# Patient Record
Sex: FEMALE | Race: WHITE
[De-identification: names, ages, dates, MRNs, and addresses within clinical notes are randomized per-mention and may not be internally consistent; named-entity substitution may affect disease eponyms.]

---

## 2018-07-28 ENCOUNTER — HOSPITAL ENCOUNTER (EMERGENCY)
Dept: HOSPITAL 62 - ER | Age: 58
LOS: 1 days | Discharge: HOME | End: 2018-07-29
Payer: SELF-PAY

## 2018-07-28 VITALS — DIASTOLIC BLOOD PRESSURE: 78 MMHG | SYSTOLIC BLOOD PRESSURE: 133 MMHG

## 2018-07-28 DIAGNOSIS — Z88.2: ICD-10-CM

## 2018-07-28 DIAGNOSIS — M79.672: ICD-10-CM

## 2018-07-28 DIAGNOSIS — R03.0: ICD-10-CM

## 2018-07-28 DIAGNOSIS — F17.210: ICD-10-CM

## 2018-07-28 DIAGNOSIS — R10.9: ICD-10-CM

## 2018-07-28 DIAGNOSIS — M54.9: ICD-10-CM

## 2018-07-28 DIAGNOSIS — N12: Primary | ICD-10-CM

## 2018-07-28 LAB
ADD MANUAL DIFF: NO
ALBUMIN SERPL-MCNC: 4.4 G/DL (ref 3.5–5)
ALP SERPL-CCNC: 75 U/L (ref 38–126)
ALT SERPL-CCNC: 18 U/L (ref 9–52)
ANION GAP SERPL CALC-SCNC: 15 MMOL/L (ref 5–19)
APPEARANCE UR: (no result)
APTT PPP: (no result) S
AST SERPL-CCNC: 19 U/L (ref 14–36)
BASOPHILS # BLD AUTO: 0 10^3/UL (ref 0–0.2)
BASOPHILS NFR BLD AUTO: 0.4 % (ref 0–2)
BILIRUB DIRECT SERPL-MCNC: 0.2 MG/DL (ref 0–0.4)
BILIRUB SERPL-MCNC: 0.3 MG/DL (ref 0.2–1.3)
BILIRUB UR QL STRIP: NEGATIVE
BUN SERPL-MCNC: 7 MG/DL (ref 7–20)
CALCIUM: 9.8 MG/DL (ref 8.4–10.2)
CHLORIDE SERPL-SCNC: 104 MMOL/L (ref 98–107)
CO2 SERPL-SCNC: 25 MMOL/L (ref 22–30)
EOSINOPHIL # BLD AUTO: 0 10^3/UL (ref 0–0.6)
EOSINOPHIL NFR BLD AUTO: 0.4 % (ref 0–6)
ERYTHROCYTE [DISTWIDTH] IN BLOOD BY AUTOMATED COUNT: 13.5 % (ref 11.5–14)
GLUCOSE SERPL-MCNC: 115 MG/DL (ref 75–110)
GLUCOSE UR STRIP-MCNC: NEGATIVE MG/DL
HCT VFR BLD CALC: 37.4 % (ref 36–47)
HGB BLD-MCNC: 12.7 G/DL (ref 12–15.5)
KETONES UR STRIP-MCNC: NEGATIVE MG/DL
LYMPHOCYTES # BLD AUTO: 1.4 10^3/UL (ref 0.5–4.7)
LYMPHOCYTES NFR BLD AUTO: 17.7 % (ref 13–45)
MCH RBC QN AUTO: 30.5 PG (ref 27–33.4)
MCHC RBC AUTO-ENTMCNC: 33.9 G/DL (ref 32–36)
MCV RBC AUTO: 90 FL (ref 80–97)
MONOCYTES # BLD AUTO: 0.6 10^3/UL (ref 0.1–1.4)
MONOCYTES NFR BLD AUTO: 7.7 % (ref 3–13)
NEUTROPHILS # BLD AUTO: 5.6 10^3/UL (ref 1.7–8.2)
NEUTS SEG NFR BLD AUTO: 73.8 % (ref 42–78)
NITRITE UR QL STRIP: NEGATIVE
PH UR STRIP: 6 [PH] (ref 5–9)
PLATELET # BLD: 440 10^3/UL (ref 150–450)
POTASSIUM SERPL-SCNC: 3.7 MMOL/L (ref 3.6–5)
PROT SERPL-MCNC: 8.1 G/DL (ref 6.3–8.2)
PROT UR STRIP-MCNC: 30 MG/DL
RBC # BLD AUTO: 4.16 10^6/UL (ref 3.72–5.28)
SODIUM SERPL-SCNC: 143.9 MMOL/L (ref 137–145)
SP GR UR STRIP: 1
TOTAL CELLS COUNTED % (AUTO): 100 %
UROBILINOGEN UR-MCNC: NEGATIVE MG/DL (ref ?–2)
WBC # BLD AUTO: 7.6 10^3/UL (ref 4–10.5)

## 2018-07-28 PROCEDURE — 80053 COMPREHEN METABOLIC PANEL: CPT

## 2018-07-28 PROCEDURE — 87186 SC STD MICRODIL/AGAR DIL: CPT

## 2018-07-28 PROCEDURE — 36415 COLL VENOUS BLD VENIPUNCTURE: CPT

## 2018-07-28 PROCEDURE — 81001 URINALYSIS AUTO W/SCOPE: CPT

## 2018-07-28 PROCEDURE — 87088 URINE BACTERIA CULTURE: CPT

## 2018-07-28 PROCEDURE — 99284 EMERGENCY DEPT VISIT MOD MDM: CPT

## 2018-07-28 PROCEDURE — 73630 X-RAY EXAM OF FOOT: CPT

## 2018-07-28 PROCEDURE — 85025 COMPLETE CBC W/AUTO DIFF WBC: CPT

## 2018-07-28 PROCEDURE — 72070 X-RAY EXAM THORAC SPINE 2VWS: CPT

## 2018-07-28 PROCEDURE — 72110 X-RAY EXAM L-2 SPINE 4/>VWS: CPT

## 2018-07-28 PROCEDURE — 87086 URINE CULTURE/COLONY COUNT: CPT

## 2018-07-28 NOTE — RADIOLOGY REPORT (SQ)
EXAM DESCRIPTION:  L SPINE WHOLE



COMPLETED DATE/TIME:  7/28/2018 6:07 pm



REASON FOR STUDY:  low back pain



COMPARISON:  None.



NUMBER OF VIEWS:  Five views including obliques.



TECHNIQUE:  AP, lateral, oblique, and sacral radiographic images acquired of the lumbar spine.



LIMITATIONS:  None.



FINDINGS:  MINERALIZATION: Normal.

SEGMENTATION: Normal.  No transitional anatomy.

ALIGNMENT: Grade 2 anterolisthesis of L5 on S1 due to bilateral L5 pars interarticularis defects.

VERTEBRAE: Maintained height.  No fracture or worrisome bone lesion.

DISCS: Multilevel disc space narrowing with osteophytes.

POSTERIOR ELEMENTS: Grade 2 anterolisthesis of L5 on S1 due to bilateral L5 pars interarticularis def
ects.. Facet arthropathy is present.

HARDWARE: None in the spine.

PARASPINAL SOFT TISSUES: Probable left renal stones.

PELVIS: Intact as visualized. No fractures or worrisome bone lesions. SI joints intact.

OTHER: No other significant finding.



IMPRESSION:  Grade 2 anterolisthesis of L5 on S1 due to bilateral L5 pars interarticularis defects.  
No vertebral compression deformity.



TECHNICAL DOCUMENTATION:  JOB ID:  [id number]

[id number]

 2011 [employer]- All Rights Reserved



Reading location - IP/workstation name: [email or web address]

## 2018-07-28 NOTE — RADIOLOGY REPORT (SQ)
EXAM DESCRIPTION:  FOOT LEFT COMPLETE



COMPLETED DATE/TIME:  7/28/2018 6:07 pm



REASON FOR STUDY:  left foot pain



COMPARISON:  None.



NUMBER OF VIEWS:  Three views.



TECHNIQUE:  AP, lateral and oblique  radiographic images acquired of the left foot.



LIMITATIONS:  None.



FINDINGS:  MINERALIZATION: Normal.

BONES: No acute fracture or dislocation.  No worrisome bone lesions.

JOINTS: No effusions.

SOFT TISSUES: No soft tissue swelling.  No foreign body.

OTHER: No other significant finding.



IMPRESSION:  NO RADIOGRAPHIC EVIDENCE OF ACUTE INJURY.



TECHNICAL DOCUMENTATION:  JOB ID:  [id number]

[id number]

 2011 [employer]- All Rights Reserved



Reading location - IP/workstation name: [email or web address]

## 2018-07-28 NOTE — RADIOLOGY REPORT (SQ)
EXAM DESCRIPTION:  T SPINE AP/LAT



COMPLETED DATE/TIME:  7/28/2018 6:07 pm



REASON FOR STUDY:  pain



COMPARISON:  None.



NUMBER OF VIEWS:  Two views.



TECHNIQUE:  AP and lateral radiographic images acquired of the thoracic spine.



LIMITATIONS:  None.



FINDINGS:  MINERALIZATION: Normal.

ALIGNMENT: Minimal scoliosis.

VERTEBRAE: No fracture or bone lesion.  Maintained height, normal segmentation.

DISCS: No significant loss of height or significant narrowing.  No large osteophytes.

HARDWARE: None in the spine.

MEDIASTINUM AND SOFT TISSUES: Normal heart size and aortic contour.  No soft tissue abnormality.

VISUALIZED LUNG FIELDS: Clear.

OTHER: No other significant finding.



IMPRESSION:  No acute findings.



TECHNICAL DOCUMENTATION:  JOB ID:  [id number]

[id number]

 2011 [employer]- All Rights Reserved



Reading location - IP/workstation name: [email or web address]

## 2018-07-28 NOTE — ER DOCUMENT REPORT
ED Medical Screen (RME)





- General


Chief Complaint: Leg Pain


Stated Complaint: LEG PAIN


Time Seen by Provider: 07/28/18 17:27


Mode of Arrival: Wheelchair


Information source: Patient


Notes: 





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 

to the emergency room with low back pain radiating down the left leg.  Patient 

states she has had it for the last few weeks.  She also complains of left foot 

pain.  She does states she has been walking a lot.  She states she was here in 

June and evaluated and was found to have a UTI.  She states she never took the 

antibiotic.  She also reports that she was diagnosed with kidney stones at that 

time.  Currently, she denies any nausea or vomiting.  She does not have any 

abdominal pain.  And her symptoms appear radicular in nature.  She denies any 

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

.  She has not followed by a physician at this time.


TRAVEL OUTSIDE OF THE [place] IN LAST 30 DAYS: No





- Related Data


Allergies/Adverse Reactions: 


 





Sulfa (Sulfonamide Antibiotics) Allergy (Verified 07/28/18 16:14)


 











Past Medical History





- Social History


Chew tobacco use (# tins/day): No


Frequency of alcohol use: None


Drug Abuse: None


Renal/ Medical History: Reports: Hx Kidney Stones.  Denies: Hx Peritoneal 

Dialysis





Physical Exam





- Vital signs


Vitals: 





 











Temp Pulse Resp BP Pulse Ox


 


 97.4 F   91   20   166/115 H  98 


 


 07/28/18 16:28  07/28/18 16:28  07/28/18 16:28  07/28/18 16:28  07/28/18 16:28














Course





- Vital Signs


Vital signs: 





 











Temp Pulse Resp BP Pulse Ox


 


 97.4 F   91   20   166/115 H  98 


 


 07/28/18 16:28  07/28/18 16:28  07/28/18 16:28  07/28/18 16:28  07/28/18 16:28
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
## 2018-07-30 ENCOUNTER — HOSPITAL ENCOUNTER (EMERGENCY)
Dept: HOSPITAL 62 - ER | Age: 58
LOS: 3 days | Discharge: HOME | End: 2018-08-02
Payer: SELF-PAY

## 2018-07-30 DIAGNOSIS — N20.0: ICD-10-CM

## 2018-07-30 DIAGNOSIS — F22: ICD-10-CM

## 2018-07-30 DIAGNOSIS — R11.0: ICD-10-CM

## 2018-07-30 DIAGNOSIS — F29: ICD-10-CM

## 2018-07-30 DIAGNOSIS — R09.81: ICD-10-CM

## 2018-07-30 DIAGNOSIS — N11.8: Primary | ICD-10-CM

## 2018-07-30 LAB
APPEARANCE UR: (no result)
APTT PPP: YELLOW S
BARBITURATES UR QL SCN: NEGATIVE
BILIRUB UR QL STRIP: NEGATIVE
GLUCOSE UR STRIP-MCNC: NEGATIVE MG/DL
KETONES UR STRIP-MCNC: NEGATIVE MG/DL
METHADONE UR QL SCN: NEGATIVE
NITRITE UR QL STRIP: NEGATIVE
PCP UR QL SCN: NEGATIVE
PH UR STRIP: 7 [PH] (ref 5–9)
PROT UR STRIP-MCNC: 30 MG/DL
SP GR UR STRIP: 1
URINE AMPHETAMINES SCREEN: NEGATIVE
URINE BENZODIAZEPINES SCREEN: NEGATIVE
URINE COCAINE SCREEN: NEGATIVE
URINE MARIJUANA (THC) SCREEN: NEGATIVE
UROBILINOGEN UR-MCNC: NEGATIVE MG/DL (ref ?–2)

## 2018-07-30 PROCEDURE — 80053 COMPREHEN METABOLIC PANEL: CPT

## 2018-07-30 PROCEDURE — 87040 BLOOD CULTURE FOR BACTERIA: CPT

## 2018-07-30 PROCEDURE — 80307 DRUG TEST PRSMV CHEM ANLYZR: CPT

## 2018-07-30 PROCEDURE — 87086 URINE CULTURE/COLONY COUNT: CPT

## 2018-07-30 PROCEDURE — 96365 THER/PROPH/DIAG IV INF INIT: CPT

## 2018-07-30 PROCEDURE — 36415 COLL VENOUS BLD VENIPUNCTURE: CPT

## 2018-07-30 PROCEDURE — 96366 THER/PROPH/DIAG IV INF ADDON: CPT

## 2018-07-30 PROCEDURE — 96375 TX/PRO/DX INJ NEW DRUG ADDON: CPT

## 2018-07-30 PROCEDURE — 96372 THER/PROPH/DIAG INJ SC/IM: CPT

## 2018-07-30 PROCEDURE — 85025 COMPLETE CBC W/AUTO DIFF WBC: CPT

## 2018-07-30 PROCEDURE — 93005 ELECTROCARDIOGRAM TRACING: CPT

## 2018-07-30 PROCEDURE — 81001 URINALYSIS AUTO W/SCOPE: CPT

## 2018-07-30 PROCEDURE — 93010 ELECTROCARDIOGRAM REPORT: CPT

## 2018-07-30 PROCEDURE — 99285 EMERGENCY DEPT VISIT HI MDM: CPT

## 2018-07-30 NOTE — ER DOCUMENT REPORT
ED General





- General


Chief Complaint: Flank Pain


Stated Complaint: LEFT FLANK PAIN


Time Seen by Provider: 07/30/18 22:56


Notes: 





Patient is a 57-year-old female presents with her daughter.  Daughter is 

employed here at the hospital.  The patient's difficult to get a good history 

from.  Should history from paramedics was that she says she has a kidney stone 

the left side she is having pain with the.  Further history from the daughter 

reveals that she was actually seen here 24 hours ago and diagnosed with a 

kidney infection and sent home.  Patient was sent home on Cipro.  She took 1 

dose since being discharged from the hospital being that she just recently got 

her medications.  All information to patient's previous visit is actually under 

a different name.  She was registered accidentally under her maiden name.  The 

name that she is registered under for her last visit is Sammie Garcia.  

Account number for the visit is [id number].  When I asked the patient served 

strong she just continues to cry and scream out that she wants something to 

drink.  It is very difficult to get any further information from the patient as 

she is emotionally distressed and not very consolable.  I spoke with the 

daughter outside the room.  The daughter says that the patient does have kidney 

infection however the bigger problem is that the patient has some psychiatric 

illness that is untreated.  Daughter says that for many years the patient will 

have episodes where she goes through times of delusions.  The daughter says in 

the last month or so it has gotten worse in the last week it has become even 

worse.  Daughter says that the patient will sometimes seem perfectly normal 

then suddenly will go outside and start screaming at the mood and at the gema 

yelling "I will kill everybody".  She will then go back to having a somewhat 

normal affect.  Daughter says that the patient will sometimes go on for several 

days of just being delusional and talking to and examining objects that are not 

there.  Patient was actually seen here for similar symptoms in 2016 and then 

sent to [place].  The daughter says when she was at [place] she was 

placed on medications she did very well on this medication; however, the 

patient never followed up with psychiatry afterwards and therefore she stopped 

taking medications and her symptoms have continued to recur since being off 

these medications.  The daughter cannot remember the name of the medications.  

As far as a kidney infection the patient does have a history of a large stone 

in the left kidney.  It is difficult to get any history in regards to whether 

or not the patient had any treatment for this or not as the patient again is 

difficult to console or speak with.


TRAVEL OUTSIDE OF THE [place] IN LAST 30 DAYS: No





- Related Data


Allergies/Adverse Reactions: 


 





No Known Allergies Allergy (Verified 11/22/12 20:01)


 











Past Medical History





- Social History


Smoking Status: Unknown if Ever Smoked


Frequency of alcohol use: None


Drug Abuse: None


Family History: Reviewed & Not Pertinent





- Immunizations


Hx Diphtheria, Pertussis, Tetanus Vaccination:  - unknown





Review of Systems





- Review of Systems


Notes: 





My Normal Review Basic





REVIEW OF SYSTEMS:


CONSTITUTIONAL :  Denies fever,  chills, or sweats.  Denies recent illness.


CARDIOVASCULAR:  Denies chest pain.


RESPIRATORY:  Denies cough, cold, or chest congestion.  Denies shortness of 

breath, difficulty breathing, or wheezing.


GASTROINTESTINAL: left sided back pain.  Some nausea.


GENITOURINARY: Dysuria.  Recent diagnosis of very tract infection


MUSCULOSKELETAL:  Denies neck or back pain or joint pain or swelling.


SKIN:   Denies rash or skin lesions.


NEUROLOGICAL:  Denies altered mental status or loss of consciousness.  Denies 

headache.  Denies weakness or paralysis or loss of use of either side.  Denies 

problems with gait or speech.  Denies sensory or motor loss.


Psychiatric: History of delusions and yelling in eminent objects and recently 

being emotionally unstable.


ALL OTHER SYSTEMS REVIEWED AND NEGATIVE.





Physical Exam





- Vital signs


Vitals: 


 











Temp Pulse Resp BP Pulse Ox


 


 98.2 F   76   18   115/60   98 


 


 07/31/18 02:15  07/31/18 02:15  07/31/18 02:15  07/31/18 02:15  07/31/18 02:15














- Notes


Notes: 





General Appearance: She is emotionally hysterical.  Is difficult to tell if she 

is actually having true pain if she is just emotional.  She is constantly 

crying and asking for something to drink.


Vitals: reviewed, See vital signs table.


Head: no swelling or tenderness to the head


Eyes: PERRL, EOMI, Conjuctiva clear


Mouth: No decreasd moisture


Lungs: No wheezing, No rales, No rhonci, No accessory muscle use, good air 

exchange bilaterally.


Heart: Normal rate, Regular rythm, No murmur, no rub


Abdomen: Normal BS, soft, No rigidity, No reproducible abdominal tenderness to 

palpation, No guarding, no rebound, no abdominal masses, no organomegaly


Back: No reproducible pain palpation on the back.


Extremities: strength 5/5 in all extremities, good pulses in all extremities, 

no swelling or tenderness in the extremities, no edema.


Skin: warm, dry, appropriate color, no rash


Neuro: speech clear, oriented x 2, odd affect, patient's constantly crying and 

difficult to respond to questions appropriately.  Cranial nerves II through XII 

are intact.  Patient is able to move all 4 extremities on her own without any 

difficulty..





Course





- Re-evaluation


Re-evalutation: 





07/31/18 01:55


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 

scan I did call and speak with Dr. Gruber, neurologist, to get his 

recommendations.  He said being the patient is not septic or toxic and that she 

has normal vital signs she can follow palpation only with them in the office.  

He says ultimate treatment is just to give antibiotics and then to follow-up 

with them in the office and they may ultimately have to discuss possible 

nephrectomy.


07/31/18 04:30





After Haldol patient come down immediately and is been appropriate and actually 

answers questions for me without any difficulty now.  She denies ever seeing a 

urologist about her kidney.  She will not really comment on the delusions at 

this time.  Patient's emotional state presentation and the story that the 

daughter told me are concerning the patient probably has some form of paranoid 

schizophrenia or delusions.  I will consult mental health evaluate the patient 

this morning.  I have spoken with the urologist who requests to place patient 

on antibiotics upon discharge and have her follow-up outpatient with them and 

they will discuss further treatment options in regards to her staghorn calculus 

in her kidney.  I have placed orders in her discharge instruction for when and 

if she is discharged to follow-up with the urologist and for antibiotics for 

her to be discharged home.


07/31/18 05:25





08/01/18 06:29








Patient requesting medication for nasal congestion. I have ordered 

pseudophedrine





- Vital Signs


Vital signs: 


 











Temp Pulse Resp BP Pulse Ox


 


 98.4 F   84   20   120/72   100 


 


 08/01/18 04:46  08/01/18 04:46  08/01/18 04:46  08/01/18 04:46  08/01/18 04:46














- Laboratory


Result Diagrams: 


 07/31/18 00:00





 07/31/18 00:00


Laboratory results interpreted by me: 


 











  07/30/18 07/31/18 07/31/18





  23:02 00:00 00:00


 


RBC   3.57 L 


 


Hgb   10.9 L 


 


Hct   32.2 L 


 


Lymphocytes %   12.4 L 


 


Potassium    3.3 L


 


Glucose    126 H


 


Urine Protein  30 H  


 


Urine Blood  MODERATE H  


 


Ur Leukocyte Esterase  LARGE H  


 


Salicylates    < 1.0 L


 


Acetaminophen    < 10 L














- EKG Interpretation by Me


Additional EKG results interpreted by me: 





07/31/18 00:59


EKG is reviewed and interpreted by me.  EKG shows normal sinus rhythm with rate 

of 70 bpm.  No ST segment elevation or depression.  No ischemic T-wave 

inversions.  WI interval, QRS duration, are within normal range.  QTc interval 

is prolonged.  Old EKG for comparison is from October 16, 2016.





Discharge





- Discharge


Clinical Impression: 


 Xanthogranulomatous pyelonephritis





Condition: Stable


Additional Instructions: 


You have a very large stone in your kidney that overtime has cause some damage 

to your kidney and has caused recurrent infections. I have spoken with the 

urologist, Dr. Gruber, who recommends placing you on antibiotics and contacting 

him for a follow up appointment as you will likely eventually need surgery on 

your kidney. Please call Dr. Gruber's office as soon as possible to make a close 

follow up appointment in regards to treatment of your kidney. Please stop 

taking the Ciprofloxacin and start taking the Keflex that I have prescribed you.


Prescriptions: 


Cephalexin Monohydrate [Keflex 500 mg Capsule] 500 mg PO Q6H 7 Days  capsule


Referrals: 


LYLE GRUBER MD [NO LOCAL MD] - Follow up in 3-5 days

## 2018-07-31 LAB
ADD MANUAL DIFF: NO
ALBUMIN SERPL-MCNC: 3.6 G/DL (ref 3.5–5)
ALP SERPL-CCNC: 73 U/L (ref 38–126)
ALT SERPL-CCNC: 20 U/L (ref 9–52)
ANION GAP SERPL CALC-SCNC: 12 MMOL/L (ref 5–19)
APAP SERPL-MCNC: < 10 UG/ML (ref 10–30)
AST SERPL-CCNC: 20 U/L (ref 14–36)
BASOPHILS # BLD AUTO: 0 10^3/UL (ref 0–0.2)
BASOPHILS NFR BLD AUTO: 0.3 % (ref 0–2)
BILIRUB DIRECT SERPL-MCNC: 0.2 MG/DL (ref 0–0.4)
BILIRUB SERPL-MCNC: 0.5 MG/DL (ref 0.2–1.3)
BUN SERPL-MCNC: 8 MG/DL (ref 7–20)
CALCIUM: 8.7 MG/DL (ref 8.4–10.2)
CHLORIDE SERPL-SCNC: 107 MMOL/L (ref 98–107)
CO2 SERPL-SCNC: 23 MMOL/L (ref 22–30)
EOSINOPHIL # BLD AUTO: 0.1 10^3/UL (ref 0–0.6)
EOSINOPHIL NFR BLD AUTO: 0.9 % (ref 0–6)
ERYTHROCYTE [DISTWIDTH] IN BLOOD BY AUTOMATED COUNT: 13.3 % (ref 11.5–14)
ETHANOL SERPL-MCNC: < 10 MG/DL
GLUCOSE SERPL-MCNC: 126 MG/DL (ref 75–110)
HCT VFR BLD CALC: 32.2 % (ref 36–47)
HGB BLD-MCNC: 10.9 G/DL (ref 12–15.5)
LYMPHOCYTES # BLD AUTO: 1.1 10^3/UL (ref 0.5–4.7)
LYMPHOCYTES NFR BLD AUTO: 12.4 % (ref 13–45)
MCH RBC QN AUTO: 30.5 PG (ref 27–33.4)
MCHC RBC AUTO-ENTMCNC: 33.9 G/DL (ref 32–36)
MCV RBC AUTO: 90 FL (ref 80–97)
MONOCYTES # BLD AUTO: 0.7 10^3/UL (ref 0.1–1.4)
MONOCYTES NFR BLD AUTO: 8.4 % (ref 3–13)
NEUTROPHILS # BLD AUTO: 6.6 10^3/UL (ref 1.7–8.2)
NEUTS SEG NFR BLD AUTO: 78 % (ref 42–78)
PLATELET # BLD: 326 10^3/UL (ref 150–450)
POTASSIUM SERPL-SCNC: 3.3 MMOL/L (ref 3.6–5)
PROT SERPL-MCNC: 6.8 G/DL (ref 6.3–8.2)
RBC # BLD AUTO: 3.57 10^6/UL (ref 3.72–5.28)
SALICYLATES SERPL-MCNC: < 1 MG/DL (ref 2–20)
SODIUM SERPL-SCNC: 142 MMOL/L (ref 137–145)
TOTAL CELLS COUNTED % (AUTO): 100 %
WBC # BLD AUTO: 8.5 10^3/UL (ref 4–10.5)

## 2018-07-31 RX ADMIN — CEPHALEXIN SCH MG: 500 CAPSULE ORAL at 10:09

## 2018-07-31 RX ADMIN — CHLORPROMAZINE HYDROCHLORIDE SCH MG: 50 TABLET, FILM COATED ORAL at 14:13

## 2018-07-31 RX ADMIN — CEPHALEXIN SCH MG: 500 CAPSULE ORAL at 14:13

## 2018-07-31 RX ADMIN — CHLORPROMAZINE HYDROCHLORIDE SCH MG: 50 TABLET, FILM COATED ORAL at 10:10

## 2018-07-31 RX ADMIN — CEPHALEXIN SCH MG: 500 CAPSULE ORAL at 18:05

## 2018-07-31 RX ADMIN — CEFTRIAXONE SODIUM SCH MG: 1 INJECTION, POWDER, FOR SOLUTION INTRAMUSCULAR; INTRAVENOUS at 10:08

## 2018-07-31 RX ADMIN — BENZTROPINE MESYLATE SCH: 1 TABLET ORAL at 10:10

## 2018-07-31 NOTE — ER DOCUMENT REPORT
Doctor's Note


Notes: 





07/31/18 09:40


As the rounding physician for our psychiatric patients, I have reviewed the 

chart, vitals, lab work. Patient has been examined and noted to be having 

delusions still. I am awaiting mental health in put.

## 2018-07-31 NOTE — EKG REPORT
SEVERITY:- BORDERLINE ECG -

SINUS RHYTHM

BORDERLINE PROLONGED QT INTERVAL

:

Confirmed by: Jordin Briceno 31-Jul-2018 09:22:23

## 2018-07-31 NOTE — RADIOLOGY REPORT (SQ)
EXAM DESCRIPTION: 



CT ABDOMEN WITHOUT IV CONTRAST



COMPLETED DATE/TME:  07/31/2018 01:02



CLINICAL HISTORY: 



57 years, Female, left flank pain



COMPARISON:

None.



TECHNIQUE:

Axial CT images of the abdomen and pelvis were obtained without

contrast.   Images stored on PACS.

 

All CT scanners at this facility use dose modulation, iterative

reconstruction, and/or weight based dosing when appropriate to

reduce radiation dose to as low as reasonably achievable (ALARA).





[facility]: Dose Right [facility]: CareDose   [facility]: Dose Right    [facility]:

Teradose 4D    [facility]: Smart Technologies



LIMITATIONS:

None.



FINDINGS:



Lung bases are clear. The liver, gallbladder, pancreas, spleen,

and adrenal glands are unremarkable. The right kidney appears

unremarkable with no evidence of nephrolithiasis or

hydronephrosis.

The left kidney is enlarged with a staghorn calculus within the

renal pelvis which measures 2.0 x 2.4 cm. There are multiple

low-attenuation lesions masses within the left kidney. There is

minimal left perinephric stranding.

There are additional stones within the left kidney, largest of

which measures 2.3 cm on the upper pole.

There is no intraperitoneal free air or fluid. There is no

lymphadenopathy. Abdominal aorta is unremarkable. 

The stomach and small bowel are unremarkable. The appendix is

normal. The colon is unremarkable. The uterus, adnexa, and

urinary bladder are unremarkable.

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

anterolisthesis of L5 over S1.





IMPRESSION:



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

left-sided xanthogranulomatous pyelonephritis. Additional stones

within the left kidney measuring up to 2.3 cm in size.

 

TECHNICAL DOCUMENTATION:



Quality ID # [id number]: Final reports with documentation of one or more

dose reduction techniques (e.g., Automated exposure control,

adjustment of the mA and/or kV according to patient size, use of

iterative reconstruction technique)



 2011 [employer]- All Rights Reserved

## 2018-08-01 RX ADMIN — CEFTRIAXONE SODIUM SCH MG: 1 INJECTION, POWDER, FOR SOLUTION INTRAMUSCULAR; INTRAVENOUS at 09:18

## 2018-08-01 RX ADMIN — FLUPHENAZINE DECANOATE SCH ML: 25 INJECTION, SOLUTION INTRAMUSCULAR; SUBCUTANEOUS at 13:57

## 2018-08-01 RX ADMIN — CHLORPROMAZINE HYDROCHLORIDE SCH MG: 50 TABLET, FILM COATED ORAL at 00:08

## 2018-08-01 RX ADMIN — FLUPHENAZINE HYDROCHLORIDE SCH MG: 2.5 TABLET, FILM COATED ORAL at 17:28

## 2018-08-01 RX ADMIN — CEPHALEXIN SCH MG: 500 CAPSULE ORAL at 17:28

## 2018-08-01 RX ADMIN — CEPHALEXIN SCH MG: 500 CAPSULE ORAL at 13:57

## 2018-08-01 RX ADMIN — FLUPHENAZINE HYDROCHLORIDE SCH MG: 2.5 TABLET, FILM COATED ORAL at 10:31

## 2018-08-01 RX ADMIN — BENZTROPINE MESYLATE SCH MG: 1 TABLET ORAL at 09:19

## 2018-08-01 RX ADMIN — CEPHALEXIN SCH MG: 500 CAPSULE ORAL at 09:19

## 2018-08-01 RX ADMIN — CHLORPROMAZINE HYDROCHLORIDE SCH MG: 50 TABLET, FILM COATED ORAL at 06:43

## 2018-08-01 NOTE — ER DOCUMENT REPORT
Doctor's Note


Notes: 





08/01/18 21:42


Medical rounds: Earlier this date, the chart was reviewed and patient was 

interviewed briefly.  Vital signs are normal.  Laboratory values are remarkable 

for apparent UTI, which is being treated after consultation with urologist.  

The patient is alert, oriented, and cooperative.  Other than some mild flank 

pain, she verbalizes no somatic complaints.  She is medically stable pending 

reevaluation and disposition by psych.

## 2018-08-01 NOTE — PSYCHOLOGICAL NOTE
Psych Note





- Psych Note


Psych Note: 


Reason for Consult: Delusions





Patient presented to [facility] ED with concerns of  kidney infection and delusions.





Attending physician noted that he spoke with the patient's daughter. She 

disclosed concerns with the patient have delusions.  She reports the patient 

has had many similar episodes in the past.  She reports that patient has 

significantly gotten worse over the last month. 





Patient disclosed she is currently at [facility] for kidney stone and because her 

neighbors harassing her.  When asked for clarification she stated that her 

neighbors harassing her because of a DSS case they recently went through.  She 

states her neighbors son ended up killing himself after the case was closed and 

since then they have not left her alone.  She reports that she wanted the 

clinician to know about this because "people need to be aware of the family."  

She continues state that police should also be notified.  When asked if the 

patient has any mental health history she denies stating "I do not have any 

mental health."  When asked if the patient felt there was anything else that 

was important to clinician need to know she stated that clinician need to know 

the DSS case number and identified that as "[id number]."





Patient is alert and orientated to person, place, time.  Mood is euthymic with 

congruent affect.  Patient denies suicidal and homicidal ideations.  Delusions 

of persecution are noted.  Patient's thoughts are organized and linear however 

are very focused on her delusions.  Eye contact was well-maintained.  

Conversational speech was within normal rate, tone and prosody.  Intellectual 

abilities appear to be within the average range.  Attention and concentration 

are fair.  Insight, judgment, impulse control are poor.





Medication recommendations per [facility]'s contracted psychiatrist Dr. Gunjan CHO 

are as follows


1.  Thorazine 50 mg every 8 hours


2.  Cogentin 1 mg daily





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





Impression/Plan: Patient is recommended to remain under IVC.  Patient 

verbalizes delusions of persecution. Medication recommendations have been 

provided.  Patient will be re-evaluated.  Dr. Keller was consulted on the care 

and management of this patient; attending physician is in agreement with 

recommendations and disposition.

## 2018-08-01 NOTE — PSYCHOLOGICAL NOTE
Psych Note





- Psych Note


Psych Note: 


Reason for Consult: Delusions





Patient presented to [facility] ED with concerns of  kidney infection and delusions.





Clinician conducted check-in with patient


Patient reports that she is "so-so" and that nothing is really change her left 

side still hurts and she is having issues still with her neighbors.  Patient 

states that her neighbors and now involve the ; "it is fraud."  She 

continues to disclose "DSS ran a fraud venue" proving her claims.  When asked 

if there is anything else important that the patient felt the clinician needed 

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





Medication recommendations per [facility]'s contracted psychiatrist Dr. Gunjan CHO 

are as follows


1.  Please discontinue Thorazine and Cogentin


2.  Please start Prolixin 5 mg twice daily


3.  Please add Prolixin decanoate 12.5 mg once this afternoon





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





Impression/Plan: Patient is recommended to remain under IVC.  Patient 

verbalizes delusions of persecution. Medication recommendation changes have 

been provided.  Patient will be re-evaluated.  Dr. Keller was consulted on the 

care and management of this patient; attending physician is in agreement with 

recommendations and disposition.

## 2018-08-02 VITALS — DIASTOLIC BLOOD PRESSURE: 91 MMHG | SYSTOLIC BLOOD PRESSURE: 165 MMHG

## 2018-08-02 RX ADMIN — CEFTRIAXONE SODIUM SCH MG: 1 INJECTION, POWDER, FOR SOLUTION INTRAMUSCULAR; INTRAVENOUS at 09:28

## 2018-08-02 RX ADMIN — FLUPHENAZINE HYDROCHLORIDE SCH MG: 2.5 TABLET, FILM COATED ORAL at 09:20

## 2018-08-02 RX ADMIN — CEPHALEXIN SCH MG: 500 CAPSULE ORAL at 13:29

## 2018-08-02 RX ADMIN — FLUPHENAZINE DECANOATE SCH ML: 25 INJECTION, SOLUTION INTRAMUSCULAR; SUBCUTANEOUS at 09:20

## 2018-08-02 RX ADMIN — CEPHALEXIN SCH MG: 500 CAPSULE ORAL at 09:20

## 2018-08-02 NOTE — ER DOCUMENT REPORT
Doctor's Note


Notes: 





08/02/18 11:16


Rounds: Chart reviewed and patient interviewed.  Patient is being evaluated for 

bizarre behavior.  Vital signs were all essentially normal.  Lab studies show 

possible UTI.  CT scan shows patient has a large left staghorn calculus.  Also 

has some stones in the right kidney.  Consult with Dr. Gruber, urology, who 

recommended antibiotics and follow-up by them as needed.  Patient is receiving 

Rocephin IV today and started on Keflex.  Vital signs are all are essentially 

normal.  Other lab studies normal.  Patient appears to be medically stable for 

transfer or discharge.


HAYLEE Fernandes MD

## 2018-08-05 NOTE — PSYCHOLOGICAL NOTE
Psych Note





- Psych Note


Psych Note: 


Reason for Consult: Delusions





Patient presented to [facility] ED with concerns of  kidney infection and delusions.





Clinician conducted check-in with patient


Patient states that she is doing well today and that she was hoping to be able 

to call her daughter because it is her daughter's birthday today.  She also 

discussed following up with a urologist to determine the best course of action 

for the kidney stone.  Patient denies current suicidal homicidal ideation.  

Patient denies seeing or hearing anything that confuses her scares her





Medication recommendations per [facility]'s contracted psychiatrist Dr. Gunjan CHO 

are as follows


1.  Please discontinue Thorazine and Cogentin


2.  Please start Prolixin 5 mg twice daily


3.  Please add Prolixin decanoate 12.5 mg once this afternoon





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





Impression/Plan: Patient is recommended for a rescind of IVC and is cleared 

from acute psychiatric services. Patient has some continued focus on 

persecutory delusions however they have improved and the patient is able to 

focus on other topics (ie her daughter's birthday). The patient does suffer 

from chronic UTI's from a very large kidney stone.  These UTIs appear to onset 

her psychosis and once controlled with medications she returns to baseline. The 

patient's delusions have reportedly been on going for a significant time frame 

and this appears to be the patient's baseline. These delusions do not harm 

herself or anyone else.  Patient denies suicidal and homicidal ideation.  She 

discussed openly about her symptoms with the clinician and her thoughts on 

following up with a urologist for her kidney stone (she believes she will ask 

for surgery to decrease the chance of continues UTIs).  Patient is recommended 

to continue mental health services and was provided a local resource list.  Dr. Keller was consulted on the care and management of this patient; attending 

physician is in agreement with recommendations and disposition.

## 2020-08-24 ENCOUNTER — HOSPITAL ENCOUNTER (EMERGENCY)
Dept: HOSPITAL 62 - ER | Age: 60
LOS: 1 days | Discharge: HOME | End: 2020-08-25
Payer: MEDICAID

## 2020-08-24 DIAGNOSIS — R60.0: ICD-10-CM

## 2020-08-24 DIAGNOSIS — S80.11XA: Primary | ICD-10-CM
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
## 2020-08-24 DIAGNOSIS — M79.89: ICD-10-CM

## 2020-08-24 DIAGNOSIS — Z88.2: ICD-10-CM

## 2020-08-24 DIAGNOSIS — N30.00: ICD-10-CM

## 2020-08-24 LAB
ADD MANUAL DIFF: NO
ALBUMIN SERPL-MCNC: 4.2 G/DL (ref 3.5–5)
ALP SERPL-CCNC: 87 U/L (ref 38–126)
ANION GAP SERPL CALC-SCNC: 9 MMOL/L (ref 5–19)
APAP SERPL-MCNC: < 10 UG/ML (ref 10–30)
APPEARANCE UR: (no result)
APTT PPP: (no result) S
AST SERPL-CCNC: 31 U/L (ref 14–36)
BARBITURATES UR QL SCN: NEGATIVE
BASOPHILS # BLD AUTO: 0 10^3/UL (ref 0–0.2)
BASOPHILS NFR BLD AUTO: 0.6 % (ref 0–2)
BILIRUB DIRECT SERPL-MCNC: 0.3 MG/DL (ref 0–0.4)
BILIRUB SERPL-MCNC: 0.4 MG/DL (ref 0.2–1.3)
BILIRUB UR QL STRIP: NEGATIVE
BUN SERPL-MCNC: 8 MG/DL (ref 7–20)
CALCIUM: 9.4 MG/DL (ref 8.4–10.2)
CHLORIDE SERPL-SCNC: 100 MMOL/L (ref 98–107)
CO2 SERPL-SCNC: 29 MMOL/L (ref 22–30)
EOSINOPHIL # BLD AUTO: 0.1 10^3/UL (ref 0–0.6)
EOSINOPHIL NFR BLD AUTO: 1.7 % (ref 0–6)
ERYTHROCYTE [DISTWIDTH] IN BLOOD BY AUTOMATED COUNT: 13.2 % (ref 11.5–14)
ETHANOL SERPL-MCNC: < 10 MG/DL
GLUCOSE SERPL-MCNC: 109 MG/DL (ref 75–110)
GLUCOSE UR STRIP-MCNC: NEGATIVE MG/DL
HCT VFR BLD CALC: 35.6 % (ref 36–47)
HGB BLD-MCNC: 12.5 G/DL (ref 12–15.5)
KETONES UR STRIP-MCNC: NEGATIVE MG/DL
LYMPHOCYTES # BLD AUTO: 1.5 10^3/UL (ref 0.5–4.7)
LYMPHOCYTES NFR BLD AUTO: 22.5 % (ref 13–45)
MCH RBC QN AUTO: 31.8 PG (ref 27–33.4)
MCHC RBC AUTO-ENTMCNC: 35 G/DL (ref 32–36)
MCV RBC AUTO: 91 FL (ref 80–97)
METHADONE UR QL SCN: NEGATIVE
MONOCYTES # BLD AUTO: 0.6 10^3/UL (ref 0.1–1.4)
MONOCYTES NFR BLD AUTO: 9.7 % (ref 3–13)
NEUTROPHILS # BLD AUTO: 4.3 10^3/UL (ref 1.7–8.2)
NEUTS SEG NFR BLD AUTO: 65.5 % (ref 42–78)
NITRITE UR QL STRIP: POSITIVE
PCP UR QL SCN: NEGATIVE
PH UR STRIP: 6 [PH] (ref 5–9)
PLATELET # BLD: 529 10^3/UL (ref 150–450)
POTASSIUM SERPL-SCNC: 3.7 MMOL/L (ref 3.6–5)
PROT SERPL-MCNC: 7.5 G/DL (ref 6.3–8.2)
PROT UR STRIP-MCNC: 100 MG/DL
RBC # BLD AUTO: 3.93 10^6/UL (ref 3.72–5.28)
SALICYLATES SERPL-MCNC: < 1 MG/DL (ref 2–20)
SP GR UR STRIP: 1
TOTAL CELLS COUNTED % (AUTO): 100 %
URINE AMPHETAMINES SCREEN: NEGATIVE
URINE BENZODIAZEPINES SCREEN: NEGATIVE
URINE COCAINE SCREEN: NEGATIVE
URINE MARIJUANA (THC) SCREEN: NEGATIVE
UROBILINOGEN UR-MCNC: NEGATIVE MG/DL (ref ?–2)
WBC # BLD AUTO: 6.5 10^3/UL (ref 4–10.5)

## 2020-08-24 PROCEDURE — 99285 EMERGENCY DEPT VISIT HI MDM: CPT

## 2020-08-24 PROCEDURE — 81001 URINALYSIS AUTO W/SCOPE: CPT

## 2020-08-24 PROCEDURE — 93010 ELECTROCARDIOGRAM REPORT: CPT

## 2020-08-24 PROCEDURE — 85025 COMPLETE CBC W/AUTO DIFF WBC: CPT

## 2020-08-24 PROCEDURE — 80053 COMPREHEN METABOLIC PANEL: CPT

## 2020-08-24 PROCEDURE — 80307 DRUG TEST PRSMV CHEM ANLYZR: CPT

## 2020-08-24 PROCEDURE — 93005 ELECTROCARDIOGRAM TRACING: CPT

## 2020-08-24 PROCEDURE — 93970 EXTREMITY STUDY: CPT

## 2020-08-24 PROCEDURE — 36415 COLL VENOUS BLD VENIPUNCTURE: CPT

## 2020-08-24 NOTE — RADIOLOGY REPORT (SQ)
EXAM DESCRIPTION: 



US EXTREMITY VEINS BILATERAL



COMPLETED DATE/TME:  08/24/2020 21:42



CLINICAL HISTORY: 



59 years, Female, pain swelling



COMPARISON:

None.



TECHNIQUE:

Axial 2-D grayscale images of both lower extremities were

acquired. Doppler was utilized.



LIMITATIONS:

None.



FINDINGS:



Bilateral common femoral, femoral, popliteal, posterior tibial,

peroneal, greater saphenous, and small saphenous veins

demonstrate normal compressibility and phasicity. However, there

was slow flow identified within the left popliteal vein.

Superficial soft tissues show no suspicious abnormality.



IMPRESSION:



No evidence of deep venous thrombosis about either lower

extremity.

 



copyright 2011 [employer]- All Rights Reserved

## 2020-08-24 NOTE — ER DOCUMENT REPORT
ED Medical Screen (RME)





- General


Chief Complaint: Psych Problem


Stated Complaint: LEFT LEG PAIN/POSSIBLE POISONING


Time Seen by Provider: 08/24/20 21:37


Mode of Arrival: Ambulatory


Information source: Patient


Notes: 





59-year-old female presented to ED for complaint of leg swelling hematomas 

bruising.  She states that her daughter passed away yesterday and she has been 

very distraught.  I did get a letter from the family that the patient has been 

screaming and has an appointment with mobile [facility] yesterday and they wanted 

her to be seen and IVC.  Patient's son-in-law is with her.  Patient is 

requesting blood work and drug testing because she thinks someone is poisoning 

her.  She states she is not a danger to herself or anybody else but she does 

want to be tested to find out what is going on with her.  She has agreed to talk

to her doctor about what is going on.

















I have greeted and performed a rapid initial assessment of this patient.  A 

comprehensive ED assessment and evaluation of the patient, analysis of test 

results and completion of medical decision making process will be conducted by 

an additional ED providers.


TRAVEL OUTSIDE OF THE [place] IN LAST 30 DAYS: No





- Related Data


Allergies/Adverse Reactions: 


                                        





Sulfa (Sulfonamide Antibiotics) Allergy (Verified 07/28/18 16:14)


   











Past Medical History


Renal/ Medical History: Reports: Hx Kidney Stones.  Denies: Hx Peritoneal 

Dialysis


Psychiatric Medical History: Reports: Hx Schizophrenia





- Immunizations


Hx Diphtheria, Pertussis, Tetanus Vaccination:  - unknown





Physical Exam





- Vital signs


Vitals: 





                                        











Temp Pulse Resp BP Pulse Ox


 


 97.6 F   86   16   141/86 H  99 


 


 08/24/20 21:26  08/24/20 21:26  08/24/20 21:26  08/24/20 21:26 08/24/20 21:26














Course





- Vital Signs


Vital signs: 





                                        











Temp Pulse Resp BP Pulse Ox


 


 97.6 F   86   16   141/86 H  99 


 


 08/24/20 21:26  08/24/20 21:26  08/24/20 21:26  08/24/20 21:26  08/24/20 21:26

## 2020-08-25 VITALS — DIASTOLIC BLOOD PRESSURE: 86 MMHG | SYSTOLIC BLOOD PRESSURE: 134 MMHG

## 2020-08-25 NOTE — ER DOCUMENT REPORT
ED General





- General


Chief Complaint: Psych Problem


Stated Complaint: LEFT LEG PAIN/POSSIBLE POISONING


Time Seen by Provider: 08/24/20 21:37


Primary Care Provider: 


[facility] [Outside] - Follow up as needed


Mode of Arrival: Ambulatory


Notes: 





Patient presents with leg swelling bilateral for the last week.  Not painful.  

She is worried because there is "spots"" hematomas" in her legs.  She initially 

has no psychiatric complaints at all.  She was seen by the NP had bilateral leg 

ultrasounds and a full set of labs which are normal.  She denies history of 

renal failure, recent chest pain shortness of breath, unilateral leg swelling 

history of DVT or PE, and smoking.


TRAVEL OUTSIDE OF THE [place] IN LAST 30 DAYS: No





- Related Data


Allergies/Adverse Reactions: 


                                        





Sulfa (Sulfonamide Antibiotics) Allergy (Verified 07/28/18 16:14)


   











Past Medical History





- General


Information source: Patient





- Social History


Smoking Status: Never Smoker


Frequency of alcohol use: None


Family History: Reviewed & Not Pertinent


Patient has homicidal ideation: No


Renal/ Medical History: Reports: Hx Kidney Stones.  Denies: Hx Peritoneal 

Dialysis


Psychiatric Medical History: Reports: Hx Schizophrenia





- Immunizations


Hx Diphtheria, Pertussis, Tetanus Vaccination:  - unknown





Review of Systems





- Review of Systems


Notes: 





REVIEW OF SYSTEMS





GEN: Denies fever, chills, weight loss


ENT: Denies sore throat, nasal discharge, ear pain


EYES: Denies blurry vision, eye pain, discharge


CV: Denies chest pain, palpitations, edema


RESP: Denies cough, shortness of breath, wheezing


GI: Denies abdominal pain, nausea, vomiting, diarrhea


MSK: Neck swelling


SKIN: Denies rash, skin lesions


LYMPH: Denies swollen glands/lymph nodes


NEURO: Denies headache, focal weakness or numbness, dizziness


PSYCH: Denies depression, suicidal or homicidal ideation








PHYSICAL EXAMINATION





General: No acute distress, well-nourished


Head: Atraumatic, normocephalic


ENT: Mouth normal, oropharynx moist, no exudates or tonsillar enlargement


Eyes: Conjunctiva normal, pupils equal, lids normal


Neck: No JVD, supple, no guarding


CVS: Normal rate, regular rhythm, no murmurs


Resp: No resp distress, equal and normal breath sounds bilaterally


GI: Nondistended, soft, no tenderness to palpation, no rebound or guarding


Ext: Leg edema bilaterally, nonpitting 1+ edema t


Back: No CVA or midline TTP


Skin: No rash, warm


Lymphatic: No lymphadeopathy noted


Neuro: Awake, alert.  Face symmetric.  GCS 15.





Physical Exam





- Vital signs


Vitals: 


                                        











Temp Pulse Resp BP Pulse Ox


 


 97.6 F   86   16   141/86 H  99 


 


 08/24/20 21:26  08/24/20 21:26  08/24/20 21:26  08/24/20 21:26  08/24/20 21:26














Course





- Re-evaluation


Re-evalutation: 





08/25/20 07:07


Leg swelling, without a history of renal kidney or heart failure, with no signs 

of pulmonary involvement.  Doubt cellulitis.  Ultrasounds of both legs by nurse 

practitioner are negative for DVT, labs are normal as well.  Initially patient 

was discharged but subsequently her son called stating that she is had paranoia 

for 6 months.  Previous history of psychosis noncompliant with treatment.  

Having some delusions as well.  He wants her IVC, but the family has made no 

attempts.  They have called mobile crisis and were told to bring her to the 

emergency room.  After long discussion with the son I agree to offer her mental 

health treatment and IVC or if she refuses.


Update 7:12 AM.  I had a long discussion with the patient.  She is denying any 

of the symptoms.  Though she may be lying, I cannot with a good conscience hold 

her against her will based on my extended interview with her including after 

speaking with her son.  I did offer her mental health assessment and she has 

refused.  She was given the number for mobile crisis.  I have discussed with the

 patient there likely diagnosis, aftercare plan, follow-up plans and my usual 

and customary return precautions.  They verbalized understanding of this.


08/25/20 07:12








- Vital Signs


Vital signs: 


                                        











Temp Pulse Resp BP Pulse Ox


 


 98.2 F   80   18   132/89 H  100 


 


 08/25/20 04:45  08/25/20 04:45  08/25/20 04:45  08/25/20 04:45  08/25/20 04:45














- Laboratory


Result Diagrams: 


                                 08/24/20 22:40





                                 08/24/20 22:40


Laboratory results interpreted by me: 


                                        











  08/24/20 08/24/20 08/24/20





  22:40 22:40 22:40


 


Hct  35.6 L  


 


Plt Count  529 H  


 


Est GFR (MDRD) Non-Af   52 L 


 


Urine Protein    100 H


 


Urine Blood    MODERATE H


 


Urine Nitrite    POSITIVE H


 


Ur Leukocyte Esterase    LARGE H


 


Salicylates   < 1.0 L 


 


Acetaminophen   < 10 L 














Discharge





- Discharge


Clinical Impression: 


 Localized swelling of both lower legs





Superficial bruising of lower leg


Qualifiers:


 Encounter type: initial encounter Laterality: right Qualified Code(s): S80.11XA

 - Contusion of right lower leg, initial encounter





UTI (urinary tract infection)


Qualifiers:


 Urinary tract infection type: acute cystitis Hematuria presence: without 

hematuria Qualified Code(s): N30.00 - Acute cystitis without hematuria





Condition: Good


Disposition: HOME, SELF-CARE


Instructions:  Dependent Edema ([facility]), Urinary Tract Infection ([facility])


Prescriptions: 


Nitrofurantoin Monohyd/M-Cryst [Macrobid 100 mg Capsule] 100 mg PO BID #10 cap


Referrals: 


[facility] [Outside] - Follow up as needed

## 2020-08-25 NOTE — EKG REPORT
SEVERITY:- OTHERWISE NORMAL ECG -

SINUS RHYTHM WITH BASELINE ARTIFACT

:

Confirmed by: Madison Nava MD 25-Aug-2020 17:05:38

## 2021-01-21 ENCOUNTER — HOSPITAL ENCOUNTER (EMERGENCY)
Dept: HOSPITAL 62 - ER | Age: 61
Discharge: HOME | End: 2021-01-21
Payer: SELF-PAY

## 2021-01-21 VITALS — DIASTOLIC BLOOD PRESSURE: 89 MMHG | SYSTOLIC BLOOD PRESSURE: 150 MMHG

## 2021-01-21 DIAGNOSIS — J44.9: ICD-10-CM

## 2021-01-21 DIAGNOSIS — R10.9: ICD-10-CM

## 2021-01-21 DIAGNOSIS — N39.0: Primary | ICD-10-CM

## 2021-01-21 DIAGNOSIS — F22: ICD-10-CM

## 2021-01-21 DIAGNOSIS — Z87.442: ICD-10-CM

## 2021-01-21 LAB
ADD MANUAL DIFF: NO
ALBUMIN SERPL-MCNC: 4 G/DL (ref 3.5–5)
ALP SERPL-CCNC: 80 U/L (ref 38–126)
ANION GAP SERPL CALC-SCNC: 7 MMOL/L (ref 5–19)
APAP SERPL-MCNC: < 10 UG/ML (ref 10–30)
APPEARANCE UR: (no result)
APTT PPP: YELLOW S
AST SERPL-CCNC: 20 U/L (ref 14–36)
BARBITURATES UR QL SCN: NEGATIVE
BASOPHILS # BLD AUTO: 0 10^3/UL (ref 0–0.2)
BASOPHILS NFR BLD AUTO: 0.4 % (ref 0–2)
BILIRUB DIRECT SERPL-MCNC: 0.1 MG/DL (ref 0–0.4)
BILIRUB SERPL-MCNC: 0.4 MG/DL (ref 0.2–1.3)
BILIRUB UR QL STRIP: NEGATIVE
BUN SERPL-MCNC: 16 MG/DL (ref 7–20)
CALCIUM: 9.4 MG/DL (ref 8.4–10.2)
CHLORIDE SERPL-SCNC: 104 MMOL/L (ref 98–107)
CO2 SERPL-SCNC: 26 MMOL/L (ref 22–30)
EOSINOPHIL # BLD AUTO: 0 10^3/UL (ref 0–0.6)
EOSINOPHIL NFR BLD AUTO: 0.6 % (ref 0–6)
ERYTHROCYTE [DISTWIDTH] IN BLOOD BY AUTOMATED COUNT: 13.2 % (ref 11.5–14)
ETHANOL SERPL-MCNC: < 10 MG/DL
GLUCOSE SERPL-MCNC: 116 MG/DL (ref 75–110)
GLUCOSE UR STRIP-MCNC: NEGATIVE MG/DL
HCT VFR BLD CALC: 34 % (ref 36–47)
HGB BLD-MCNC: 11.7 G/DL (ref 12–15.5)
KETONES UR STRIP-MCNC: NEGATIVE MG/DL
LYMPHOCYTES # BLD AUTO: 1.1 10^3/UL (ref 0.5–4.7)
LYMPHOCYTES NFR BLD AUTO: 16 % (ref 13–45)
MCH RBC QN AUTO: 29.7 PG (ref 27–33.4)
MCHC RBC AUTO-ENTMCNC: 34.3 G/DL (ref 32–36)
MCV RBC AUTO: 87 FL (ref 80–97)
METHADONE UR QL SCN: NEGATIVE
MONOCYTES # BLD AUTO: 0.6 10^3/UL (ref 0.1–1.4)
MONOCYTES NFR BLD AUTO: 7.9 % (ref 3–13)
NEUTROPHILS # BLD AUTO: 5.3 10^3/UL (ref 1.7–8.2)
NEUTS SEG NFR BLD AUTO: 75.1 % (ref 42–78)
NITRITE UR QL STRIP: NEGATIVE
PCP UR QL SCN: NEGATIVE
PH UR STRIP: 7 [PH] (ref 5–9)
PLATELET # BLD: 474 10^3/UL (ref 150–450)
POTASSIUM SERPL-SCNC: 4.2 MMOL/L (ref 3.6–5)
PROT SERPL-MCNC: 7.7 G/DL (ref 6.3–8.2)
PROT UR STRIP-MCNC: 100 MG/DL
RBC # BLD AUTO: 3.92 10^6/UL (ref 3.72–5.28)
SALICYLATES SERPL-MCNC: < 1 MG/DL (ref 2–20)
SP GR UR STRIP: 1.01
TOTAL CELLS COUNTED % (AUTO): 100 %
URINE AMPHETAMINES SCREEN: NEGATIVE
URINE BENZODIAZEPINES SCREEN: NEGATIVE
URINE COCAINE SCREEN: NEGATIVE
URINE MARIJUANA (THC) SCREEN: NEGATIVE
UROBILINOGEN UR-MCNC: NEGATIVE MG/DL (ref ?–2)
WBC # BLD AUTO: 7.1 10^3/UL (ref 4–10.5)

## 2021-01-21 PROCEDURE — 81001 URINALYSIS AUTO W/SCOPE: CPT

## 2021-01-21 PROCEDURE — 87086 URINE CULTURE/COLONY COUNT: CPT

## 2021-01-21 PROCEDURE — 87088 URINE BACTERIA CULTURE: CPT

## 2021-01-21 PROCEDURE — 80053 COMPREHEN METABOLIC PANEL: CPT

## 2021-01-21 PROCEDURE — 93005 ELECTROCARDIOGRAM TRACING: CPT

## 2021-01-21 PROCEDURE — 36415 COLL VENOUS BLD VENIPUNCTURE: CPT

## 2021-01-21 PROCEDURE — 85025 COMPLETE CBC W/AUTO DIFF WBC: CPT

## 2021-01-21 PROCEDURE — 96365 THER/PROPH/DIAG IV INF INIT: CPT

## 2021-01-21 PROCEDURE — 93010 ELECTROCARDIOGRAM REPORT: CPT

## 2021-01-21 PROCEDURE — 87186 SC STD MICRODIL/AGAR DIL: CPT

## 2021-01-21 PROCEDURE — 99284 EMERGENCY DEPT VISIT MOD MDM: CPT

## 2021-01-21 PROCEDURE — 80307 DRUG TEST PRSMV CHEM ANLYZR: CPT

## 2021-01-21 NOTE — PSYCHOLOGICAL NOTE
Psych Note





- Psych Note


Date seen by psych provider: 01/21/21


Time seen by psych provider: 11:06


Psych Note: 


Collateral Information:





At 1106 spoke to  from [facility]. She reported patient has diagnoses of 

Delusional Disorder, Generalized Anxiety Disorder, Unspecified Psychosis, and 

Mental Disorder Not Otherwise Specified. She stated it looks like patient has 

not been inpatient in awhile. She noted mobile crisis involvement in August and 

September 2020. She identified it looks like patient does not have an outpatient

provider as there is no information listed.
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
